# Patient Record
Sex: FEMALE | Race: ASIAN | Employment: PART TIME | URBAN - NONMETROPOLITAN AREA
[De-identification: names, ages, dates, MRNs, and addresses within clinical notes are randomized per-mention and may not be internally consistent; named-entity substitution may affect disease eponyms.]

---

## 2022-07-27 ENCOUNTER — OFFICE VISIT (OUTPATIENT)
Dept: FAMILY MEDICINE CLINIC | Age: 37
End: 2022-07-27
Payer: COMMERCIAL

## 2022-07-27 VITALS
HEART RATE: 80 BPM | SYSTOLIC BLOOD PRESSURE: 118 MMHG | OXYGEN SATURATION: 97 % | TEMPERATURE: 97.7 F | HEIGHT: 62 IN | BODY MASS INDEX: 35.44 KG/M2 | WEIGHT: 192.6 LBS | DIASTOLIC BLOOD PRESSURE: 80 MMHG

## 2022-07-27 DIAGNOSIS — L40.9 PSORIASIS: Primary | ICD-10-CM

## 2022-07-27 DIAGNOSIS — F41.0 EPISODIC PAROXYSMAL ANXIETY DISORDER: ICD-10-CM

## 2022-07-27 PROBLEM — K21.9 GASTROESOPHAGEAL REFLUX DISEASE WITHOUT ESOPHAGITIS: Status: ACTIVE | Noted: 2019-12-16

## 2022-07-27 PROCEDURE — 99203 OFFICE O/P NEW LOW 30 MIN: CPT | Performed by: FAMILY MEDICINE

## 2022-07-27 RX ORDER — CHLORAL HYDRATE 500 MG
1 CAPSULE ORAL 2 TIMES DAILY
COMMUNITY

## 2022-07-27 RX ORDER — ASCORBIC ACID 500 MG
500 TABLET ORAL 2 TIMES DAILY
COMMUNITY

## 2022-07-27 RX ORDER — VIT C/B6/B5/MAGNESIUM/HERB 173 50-5-6-5MG
1500 CAPSULE ORAL 2 TIMES DAILY
COMMUNITY

## 2022-07-27 ASSESSMENT — ENCOUNTER SYMPTOMS
BLOOD IN STOOL: 1
ABDOMINAL PAIN: 0

## 2022-07-27 ASSESSMENT — PATIENT HEALTH QUESTIONNAIRE - PHQ9
SUM OF ALL RESPONSES TO PHQ QUESTIONS 1-9: 0
SUM OF ALL RESPONSES TO PHQ9 QUESTIONS 1 & 2: 0
1. LITTLE INTEREST OR PLEASURE IN DOING THINGS: 0
2. FEELING DOWN, DEPRESSED OR HOPELESS: 0

## 2022-07-27 NOTE — PROGRESS NOTES
[facility]  [address], [address] [place]  [phone]      Sohail Kinsey is a 42 y.o. female who presents today for her medical conditions/complaints as noted below. Sohail Kinsey is c/o of Establish Care      HPI:     Pt here today to establish - was most recently seeing Dr. Abram Us; just saw her last month. Taking Vit C 500 mg BID, Calcium + D BID, Fish oil 1000 mg BID, turmeric 1500 mg BID, and probiotic (2) daily. Also takes collagen and spirulina. Pt has h/o psoriasis on her scalp, back of neck, ears, and hairline. Uses Gold[employer] cream as needed, which does help. Lives at home with , 3 kids, and her mother. 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 No h/o tobacco use. Denies recreational drug use. Denies alcohol use. Denies specific diet/exercise regimen - tries to eat lower carbs and has done intermittent fasting for 18 hours at a time. History reviewed. No pertinent past medical history. Past Surgical History:   Procedure Laterality Date    HEMORRHOID SURGERY  2020    Dr. Fede Fall HISTORY  2012    cyst removal from ovary - Dr. Wicho Patel     Family History   Problem Relation Age of Onset    Kidney Disease Mother     High Blood Pressure Mother     Diabetes Paternal Grandmother     Pacemaker Paternal Grandfather      Social History     Tobacco Use    Smoking status: Never    Smokeless tobacco: Never   Substance Use Topics    Alcohol use: Not on file      Current Outpatient Medications   Medication Sig Dispense Refill    ascorbic acid (VITAMIN C) 500 MG tablet Take 500 mg by mouth in the morning and 500 mg before bedtime. Omega-3 Fatty Acids (FISH OIL) 1000 MG CAPS Take 1 capsule by mouth in the morning and 1 capsule before bedtime. turmeric 500 MG CAPS Take 1,500 mg by mouth in the morning and 1,500 mg before bedtime.       Probiotic Product (PROBIOTIC PO) Take 2 capsules by mouth daily Calcium Carb-Cholecalciferol (CALCIUM+D3 PO) Take by mouth in the morning and at bedtime      COLLAGEN PO Take 3 each by mouth in the morning and at bedtime      SPIRULINA PO Take 3 each by mouth daily       No current facility-administered medications for this visit. Allergies   Allergen Reactions    Oxycodone-Acetaminophen Hives     Numbness and tingling of lips/tongue/ hives on legs       Health Maintenance   Topic Date Due    Varicella vaccine (1 of 2 - 2-dose childhood series) Never done    HIV screen  Never done    Hepatitis C screen  Never done    Cervical cancer screen  Never done    Diabetes screen  Never done    Flu vaccine (1) 09/01/2022    Depression Screen  07/27/2023    DTaP/Tdap/Td vaccine (4 - Td or Tdap) 04/30/2029    COVID-19 Vaccine  Completed    Hepatitis A vaccine  Aged Out    Hepatitis B vaccine  Aged Out    Hib vaccine  Aged Out    Meningococcal (ACWY) vaccine  Aged Out    Pneumococcal 0-64 years Vaccine  Aged Out       Subjective:      Review of Systems   Constitutional:  Negative for fever. Cardiovascular:  Negative for chest pain, palpitations and leg swelling. Gastrointestinal:  Positive for blood in stool (infrequent, when she doesn't eat enough vegetables). Negative for abdominal pain. Genitourinary:  Negative for dysuria and hematuria. Skin:  Positive for rash (psoriasis). Neurological:  Negative for dizziness. Psychiatric/Behavioral:  Negative for dysphoric mood and suicidal ideas. 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 Objective:     Vitals:    07/27/22 1402   BP: 118/80   Site: Right Upper Arm   Position: Sitting   Cuff Size: Large Adult   Pulse: 80   Temp: 97.7 °F (36.5 °C)   TempSrc: Temporal   SpO2: 97%   Weight: 192 lb 9.6 oz (87.4 kg)   Height: 5' 2\" (1.575 m)     Physical Exam  Vitals and nursing note reviewed.    Constitutional:       General: She is not in acute distress. Appearance: She is well-developed. HENT:      Head: Normocephalic and atraumatic. Right Ear: Tympanic membrane, ear canal and external ear normal.      Left Ear: Tympanic membrane, ear canal and external ear normal.      Nose: Nose normal.      Mouth/Throat:      Mouth: Mucous membranes are moist.      Pharynx: Oropharynx is clear. No posterior oropharyngeal erythema. Eyes:      Conjunctiva/sclera: Conjunctivae normal.   Cardiovascular:      Rate and Rhythm: Normal rate and regular rhythm. Heart sounds: Normal heart sounds. Pulmonary:      Effort: Pulmonary effort is normal. No respiratory distress. Breath sounds: Normal breath sounds. Abdominal:      General: Bowel sounds are normal. There is no distension. Palpations: Abdomen is soft. Tenderness: There is no abdominal tenderness. Musculoskeletal:      Cervical back: Neck supple. Skin:     General: Skin is warm and dry. Neurological:      Mental Status: She is alert and oriented to person, place, and time. Assessment:      1. Psoriasis  2. Episodic paroxysmal anxiety disorder       Plan:      Return in about 1 year (around 7/27/2023) for yearly physical.    No orders of the defined types were placed in this encounter. No orders of the defined types were placed in this encounter. Patient given educational materials - see patient instructions. Discussed use, benefit, and side effects of prescribed medications. All patient questions answered. Pt voiced understanding. Reviewed health maintenance.             Electronically signed by Annmarie Flores DO, DO on 8/7/2022 at 11:42 PM